# Patient Record
Sex: MALE | Race: WHITE | NOT HISPANIC OR LATINO | RURAL
[De-identification: names, ages, dates, MRNs, and addresses within clinical notes are randomized per-mention and may not be internally consistent; named-entity substitution may affect disease eponyms.]

---

## 2022-05-11 ENCOUNTER — NEW PATIENT (OUTPATIENT)
Dept: RURAL CLINIC 2 | Facility: CLINIC | Age: 62
End: 2022-05-11

## 2022-05-11 DIAGNOSIS — Z98.890: ICD-10-CM

## 2022-05-11 DIAGNOSIS — Z79.4: ICD-10-CM

## 2022-05-11 DIAGNOSIS — H16.223: ICD-10-CM

## 2022-05-11 DIAGNOSIS — H25.813: ICD-10-CM

## 2022-05-11 DIAGNOSIS — E11.3293: ICD-10-CM

## 2022-05-11 PROCEDURE — 92134 CPTRZ OPH DX IMG PST SGM RTA: CPT

## 2022-05-11 PROCEDURE — 99204 OFFICE O/P NEW MOD 45 MIN: CPT

## 2022-05-11 ASSESSMENT — VISUAL ACUITY
OD_SC: 20/25
OS_SC: 20/20
OU_SC: 20/20
OU_CC: J1+

## 2022-05-11 ASSESSMENT — TONOMETRY
OS_IOP_MMHG: 17
OD_IOP_MMHG: 17